# Patient Record
Sex: MALE | Race: WHITE | HISPANIC OR LATINO | URBAN - METROPOLITAN AREA
[De-identification: names, ages, dates, MRNs, and addresses within clinical notes are randomized per-mention and may not be internally consistent; named-entity substitution may affect disease eponyms.]

---

## 2023-10-17 ENCOUNTER — WEB ENCOUNTER (OUTPATIENT)
Dept: URBAN - METROPOLITAN AREA CLINIC 54 | Facility: CLINIC | Age: 48
End: 2023-10-17

## 2023-10-17 ENCOUNTER — OFFICE VISIT (OUTPATIENT)
Dept: URBAN - NONMETROPOLITAN AREA CLINIC 4 | Facility: CLINIC | Age: 48
End: 2023-10-17
Payer: COMMERCIAL

## 2023-10-17 VITALS
HEIGHT: 71 IN | DIASTOLIC BLOOD PRESSURE: 76 MMHG | WEIGHT: 184.8 LBS | HEART RATE: 95 BPM | SYSTOLIC BLOOD PRESSURE: 121 MMHG | BODY MASS INDEX: 25.87 KG/M2 | TEMPERATURE: 97.7 F

## 2023-10-17 DIAGNOSIS — K52.9 ACUTE AND CHRONIC COLITIS: ICD-10-CM

## 2023-10-17 DIAGNOSIS — R10.10 UPPER ABDOMINAL PAIN: ICD-10-CM

## 2023-10-17 DIAGNOSIS — K85.10 ACUTE BILIARY PANCREATITIS WITHOUT INFECTION OR NECROSIS: ICD-10-CM

## 2023-10-17 DIAGNOSIS — K59.01 CONSTIPATION: ICD-10-CM

## 2023-10-17 PROBLEM — 428882003: Status: ACTIVE | Noted: 2023-10-17

## 2023-10-17 PROBLEM — 197321007: Status: ACTIVE | Noted: 2023-10-17

## 2023-10-17 PROCEDURE — 99204 OFFICE O/P NEW MOD 45 MIN: CPT | Performed by: REGISTERED NURSE

## 2023-10-17 RX ORDER — HYOSCYAMINE SULFATE 0.12 MG/1
1 TABLET UNDER THE TONGUE AND ALLOW TO DISSOLVE  AS NEEDED TABLET SUBLINGUAL THREE TIMES A DAY
Status: ACTIVE | COMMUNITY

## 2023-10-17 RX ORDER — DEXTROAMPHETAMINE SACCHARATE, AMPHETAMINE ASPARTATE, DEXTROAMPHETAMINE SULFATE, AND AMPHETAMINE SULFATE 2.5; 2.5; 2.5; 2.5 MG/1; MG/1; MG/1; MG/1
1 TABLET TABLET ORAL TWICE A DAY
Status: ACTIVE | COMMUNITY

## 2023-10-17 RX ORDER — PANTOPRAZOLE SODIUM 40 MG/1
1 TABLET TABLET, DELAYED RELEASE ORAL ONCE A DAY
Status: ACTIVE | COMMUNITY

## 2023-10-17 NOTE — HPI-TODAY'S VISIT:
10/17/23: Pt is a 47 yo male with PMH of GERD, biliary pancreatitis s/p CCY 9/18/23, s/p gastric bypass in Dec 2021 who is self referred for evaluation of abdomnial pain.   Pt reports acute onset of eigastric pain with associated nausea and bloating that occured on 9/10/23. Pain gets so severe, he starts hyperventilating. Pain radiates into his back. He went to the ED on 9/11. LIpase noted at 238,   AST 14, A LT 27,  Alk phos 74,  T bili 0.5. CT scan with no acute findings. Right upper quadrant ultrasound also completed with concerns for some biliary sludge.  No CBD. Mild fatty infiltration of liver also noted. He was admitted for acute pancreatitis. Patient was placed on PPI. Diet was advanced and he was discahrged home on 9/13. He was seen by bariatric surgeon, Dr. Colin Magana on 9/14. He was seen by general surgeon, Dr. Liliya Shafer on 9/15 and underwent lap alice on 9/18. Pt went back to the ED on 9/23 with c/o epigastric pain. CT scan with no cystic free fluid seen inferior to the cecum in the pelvis. No evidence of focal fluid that would suggest abscess in this patient who has recently undergone cholecystectomy Moderate stool which may reflect constipation. He followed up with Dr. Magana on 9/26 and was presribed Carafate. He underwent EGD on 10/3. Records not available, but no acute findings per patient. He was seen in the ED again on 10/10 with persistent epigastric pain, nausea and abdominal bloating. LIpase 173, , . Tbili and Alk phos wnl. CT scan showed mild enteritis. He was discharged home on Augmentin. Since discharge, he had been doing ok until this past weekend when he had recurrent pain. He has been taking Levsin, Zofran, Carafate, Protonix, Tramadol and Miralax. States he has lost alomost 20 lbs. His last colonoscopy was at least 5 years ago at [facility] and was reportedly normal.

## 2023-10-18 LAB
ALBUMIN/GLOBULIN RATIO: 1.7
ALBUMIN: 4.5
ALKALINE PHOSPHATASE: 90
ALT: 90
AST: 43
BILIRUBIN, TOTAL: 0.4
BUN/CREATININE RATIO: (no result)
CALCIUM: 9.8
CARBON DIOXIDE: 33
CHLORIDE: 102
CREATININE: 0.9
EGFR: 104
FIB 4 INDEX: 0.93
GLOBULIN: 2.6
GLUCOSE: 89
PLATELET COUNT: 244
POTASSIUM: 4.2
PROTEIN, TOTAL: 7.1
SODIUM: 139
UREA NITROGEN (BUN): 19

## 2023-10-23 ENCOUNTER — WEB ENCOUNTER (OUTPATIENT)
Dept: URBAN - NONMETROPOLITAN AREA CLINIC 4 | Facility: CLINIC | Age: 48
End: 2023-10-23

## 2023-10-24 ENCOUNTER — OFFICE VISIT (OUTPATIENT)
Dept: URBAN - NONMETROPOLITAN AREA CLINIC 4 | Facility: CLINIC | Age: 48
End: 2023-10-24
Payer: COMMERCIAL

## 2023-10-24 ENCOUNTER — LAB OUTSIDE AN ENCOUNTER (OUTPATIENT)
Dept: URBAN - NONMETROPOLITAN AREA CLINIC 4 | Facility: CLINIC | Age: 48
End: 2023-10-24

## 2023-10-24 VITALS
BODY MASS INDEX: 25.79 KG/M2 | HEIGHT: 71 IN | WEIGHT: 184.2 LBS | DIASTOLIC BLOOD PRESSURE: 72 MMHG | TEMPERATURE: 97.9 F | HEART RATE: 64 BPM | SYSTOLIC BLOOD PRESSURE: 118 MMHG

## 2023-10-24 DIAGNOSIS — R10.84 ABDOMINAL PAIN, GENERALIZED: ICD-10-CM

## 2023-10-24 DIAGNOSIS — K85.10 ACUTE BILIARY PANCREATITIS WITHOUT INFECTION OR NECROSIS: ICD-10-CM

## 2023-10-24 DIAGNOSIS — K59.09 CHRONIC CONSTIPATION: ICD-10-CM

## 2023-10-24 DIAGNOSIS — R79.89 ELEVATED LFTS: ICD-10-CM

## 2023-10-24 DIAGNOSIS — R14.0 ABDOMINAL BLOATING: ICD-10-CM

## 2023-10-24 DIAGNOSIS — R11.0 NAUSEA: ICD-10-CM

## 2023-10-24 DIAGNOSIS — Z12.11 ENCOUNTER FOR SCREENING COLONOSCOPY: ICD-10-CM

## 2023-10-24 DIAGNOSIS — Z90.49 STATUS POST CHOLECYSTECTOMY: ICD-10-CM

## 2023-10-24 DIAGNOSIS — K21.9 GASTROESOPHAGEAL REFLUX DISEASE WITHOUT ESOPHAGITIS: ICD-10-CM

## 2023-10-24 DIAGNOSIS — R10.9 ABDOMINAL CRAMPING: ICD-10-CM

## 2023-10-24 DIAGNOSIS — K76.0 FATTY LIVER: ICD-10-CM

## 2023-10-24 DIAGNOSIS — K59.00 CONSTIPATION, UNSPECIFIED CONSTIPATION TYPE: ICD-10-CM

## 2023-10-24 DIAGNOSIS — Z98.84 STATUS POST GASTRIC BYPASS FOR OBESITY: ICD-10-CM

## 2023-10-24 DIAGNOSIS — R10.10 UPPER ABDOMINAL PAIN: ICD-10-CM

## 2023-10-24 DIAGNOSIS — K52.9 ENTERITIS: ICD-10-CM

## 2023-10-24 PROBLEM — 14760008: Status: ACTIVE | Noted: 2023-10-17

## 2023-10-24 PROCEDURE — 99214 OFFICE O/P EST MOD 30 MIN: CPT | Performed by: REGISTERED NURSE

## 2023-10-24 RX ORDER — HYOSCYAMINE SULFATE 0.12 MG/1
1 TABLET UNDER THE TONGUE AND ALLOW TO DISSOLVE  AS NEEDED TABLET SUBLINGUAL THREE TIMES A DAY
Status: ACTIVE | COMMUNITY

## 2023-10-24 RX ORDER — PANTOPRAZOLE SODIUM 40 MG/1
1 TABLET TABLET, DELAYED RELEASE ORAL ONCE A DAY
Status: ACTIVE | COMMUNITY

## 2023-10-24 RX ORDER — DEXTROAMPHETAMINE SACCHARATE, AMPHETAMINE ASPARTATE, DEXTROAMPHETAMINE SULFATE, AND AMPHETAMINE SULFATE 2.5; 2.5; 2.5; 2.5 MG/1; MG/1; MG/1; MG/1
1 TABLET TABLET ORAL TWICE A DAY
Status: ACTIVE | COMMUNITY

## 2023-10-24 RX ORDER — SODIUM PICOSULFATE, MAGNESIUM OXIDE, AND ANHYDROUS CITRIC ACID 12; 3.5; 1 G/175ML; G/175ML; MG/175ML
175 ML THE FIRST DOSE THE EVENING BEFORE AND SECOND DOSE THE MORNING OF COLONOSCOPY LIQUID ORAL ONCE A DAY
Qty: 350 | Refills: 0 | OUTPATIENT
Start: 2023-10-24 | End: 2023-10-26

## 2023-10-24 NOTE — HPI-TODAY'S VISIT:
10/17/23: Pt is a 47 yo male with PMH of GERD, biliary pancreatitis s/p CCY 9/18/23, s/p gastric bypass in Dec 2021 who is self referred for evaluation of abdomnial pain.   Pt reports acute onset of eigastric pain with associated nausea and bloating that occured on 9/10/23. Pain gets so severe, he starts hyperventilating. Pain radiates into his back. He went to the ED on 9/11. LIpase noted at 238,   AST 14, A LT 27,  Alk phos 74,  T bili 0.5. CT scan with no acute findings. Right upper quadrant ultrasound also completed with concerns for some biliary sludge.  No CBD. Mild fatty infiltration of liver also noted. He was admitted for acute pancreatitis. Patient was placed on PPI. Diet was advanced and he was discahrged home on 9/13. He was seen by bariatric surgeon, Dr. Colin Magana on 9/14. He was seen by general surgeon, Dr. Liliya Shafer on 9/15 and underwent lap alice on 9/18. Pt went back to the ED on 9/23 with c/o epigastric pain. CT scan with no cystic free fluid seen inferior to the cecum in the pelvis. No evidence of focal fluid that would suggest abscess in this patient who has recently undergone cholecystectomy Moderate stool which may reflect constipation. He followed up with Dr. Magana on 9/26 and was presribed Carafate. He underwent EGD on 10/3. Records not available, but no acute findings per patient. He was seen in the ED again on 10/10 with persistent epigastric pain, nausea and abdominal bloating. LIpase 173, , . Tbili and Alk phos wnl. CT scan showed mild enteritis. He was discharged home on Augmentin. Since discharge, he had been doing ok until this past weekend when he had recurrent pain. He has been taking Levsin, Zofran, Carafate, Protonix, Tramadol and Miralax. States he has lost alomost 20 lbs. His last colonoscopy was at least 5 years ago at [facility] and was reportedly normal.  10/24/23: Pt RTC for urgent visit. He reports worsening abdominal pain/crmpaing this past Sunday in the early morning. He took Tramadol. The pain/cramping persisted on Sunday. He has been taking Miralax 1 1/2 caps twice daily. His stools are soft, but he continues to have to strain and does not feel like he completely evacuates. Last night, his pain recurred. He took a Dulcolax and Levsin. Has had 1 BM today. He took Levsin again today which provides some relief. He is not sure when his last colonoscopy was.

## 2023-11-10 ENCOUNTER — OFFICE VISIT (OUTPATIENT)
Dept: URBAN - NONMETROPOLITAN AREA CLINIC 4 | Facility: CLINIC | Age: 48
End: 2023-11-10
Payer: COMMERCIAL

## 2023-11-10 VITALS
TEMPERATURE: 97.9 F | SYSTOLIC BLOOD PRESSURE: 134 MMHG | HEART RATE: 99 BPM | BODY MASS INDEX: 26.04 KG/M2 | DIASTOLIC BLOOD PRESSURE: 77 MMHG | HEIGHT: 71 IN | WEIGHT: 186 LBS

## 2023-11-10 DIAGNOSIS — K21.9 GASTROESOPHAGEAL REFLUX DISEASE WITHOUT ESOPHAGITIS: ICD-10-CM

## 2023-11-10 DIAGNOSIS — K58.1 IRRITABLE BOWEL SYNDROME WITH CONSTIPATION: ICD-10-CM

## 2023-11-10 DIAGNOSIS — Z98.84 STATUS POST GASTRIC BYPASS FOR OBESITY: ICD-10-CM

## 2023-11-10 DIAGNOSIS — Z90.49 STATUS POST CHOLECYSTECTOMY: ICD-10-CM

## 2023-11-10 DIAGNOSIS — K76.0 FATTY LIVER: ICD-10-CM

## 2023-11-10 DIAGNOSIS — Z12.11 ENCOUNTER FOR SCREENING COLONOSCOPY: ICD-10-CM

## 2023-11-10 PROBLEM — 440630006: Status: ACTIVE | Noted: 2023-11-10

## 2023-11-10 PROBLEM — 266435005: Status: ACTIVE | Noted: 2023-10-17

## 2023-11-10 PROCEDURE — 99214 OFFICE O/P EST MOD 30 MIN: CPT | Performed by: REGISTERED NURSE

## 2023-11-10 RX ORDER — LINACLOTIDE 290 UG/1
1 CAPSULE AT LEAST 30 MINUTES BEFORE THE FIRST MEAL OF THE DAY ON AN EMPTY STOMACH CAPSULE, GELATIN COATED ORAL ONCE A DAY
Qty: 90 | Refills: 3 | OUTPATIENT
Start: 2023-11-10 | End: 2024-11-04

## 2023-11-10 RX ORDER — HYOSCYAMINE SULFATE 0.12 MG/1
1 TABLET UNDER THE TONGUE AND ALLOW TO DISSOLVE  AS NEEDED TABLET SUBLINGUAL THREE TIMES A DAY
Status: ACTIVE | COMMUNITY

## 2023-11-10 RX ORDER — DEXTROAMPHETAMINE SACCHARATE, AMPHETAMINE ASPARTATE, DEXTROAMPHETAMINE SULFATE, AND AMPHETAMINE SULFATE 2.5; 2.5; 2.5; 2.5 MG/1; MG/1; MG/1; MG/1
1 TABLET TABLET ORAL TWICE A DAY
Status: ACTIVE | COMMUNITY

## 2023-11-10 RX ORDER — PANTOPRAZOLE SODIUM 40 MG/1
1 TABLET TABLET, DELAYED RELEASE ORAL ONCE A DAY
Status: ACTIVE | COMMUNITY

## 2023-11-10 RX ORDER — FAMOTIDINE 20 MG/1
1 TABLET AT BEDTIME AS NEEDED TABLET, FILM COATED ORAL ONCE A DAY
Qty: 90 TABLET | Refills: 3 | OUTPATIENT
Start: 2023-11-10

## 2023-11-10 NOTE — HPI-TODAY'S VISIT:
10/17/23: Pt is a 47 yo male with PMH of GERD, biliary pancreatitis s/p CCY 9/18/23, s/p gastric bypass in Dec 2021 who is self referred for evaluation of abdomnial pain.   Pt reports acute onset of eigastric pain with associated nausea and bloating that occured on 9/10/23. Pain gets so severe, he starts hyperventilating. Pain radiates into his back. He went to the ED on 9/11. LIpase noted at 238,   AST 14, A LT 27,  Alk phos 74,  T bili 0.5. CT scan with no acute findings. Right upper quadrant ultrasound also completed with concerns for some biliary sludge.  No CBD. Mild fatty infiltration of liver also noted. He was admitted for acute pancreatitis. Patient was placed on PPI. Diet was advanced and he was discahrged home on 9/13. He was seen by bariatric surgeon, Dr. Coiln Magana on 9/14. He was seen by general surgeon, Dr. Liliya Shafer on 9/15 and underwent lap alice on 9/18. Pt went back to the ED on 9/23 with c/o epigastric pain. CT scan with no cystic free fluid seen inferior to the cecum in the pelvis. No evidence of focal fluid that would suggest abscess in this patient who has recently undergone cholecystectomy Moderate stool which may reflect constipation. He followed up with Dr. Magana on 9/26 and was presribed Carafate. He underwent EGD on 10/3. Records not available, but no acute findings per patient. He was seen in the ED again on 10/10 with persistent epigastric pain, nausea and abdominal bloating. LIpase 173, , . Tbili and Alk phos wnl. CT scan showed mild enteritis. He was discharged home on Augmentin. Since discharge, he had been doing ok until this past weekend when he had recurrent pain. He has been taking Levsin, Zofran, Carafate, Protonix, Tramadol and Miralax. States he has lost alomost 20 lbs. His last colonoscopy was at least 5 years ago at [facility] and was reportedly normal.  10/24/23: Pt RTC for urgent visit. He reports worsening abdominal pain/crmpaing this past Sunday in the early morning. He took Tramadol. The pain/cramping persisted on Sunday. He has been taking Miralax 1 1/2 caps twice daily. His stools are soft, but he continues to have to strain and does not feel like he completely evacuates. Last night, his pain recurred. He took a Dulcolax and Levsin. Has had 1 BM today. He took Levsin again today which provides some relief. He is not sure when his last colonoscopy was.  11/10/23: Pt RTC for f/u. Reports improvement in constipation with Linzess. Having a BM daily. He uses a squatty potty. Admits to straining. Stools are soft, but very long. He is still taking Metamucil. He is scheduled for cscope on 12/8/23. Reports mild epigatric burning after drinking crystal light water and a sip of Ana María. He takes Protonix in AM and Pepcid 20 mg at bedtime.

## 2023-11-14 ENCOUNTER — WEB ENCOUNTER (OUTPATIENT)
Dept: URBAN - NONMETROPOLITAN AREA CLINIC 4 | Facility: CLINIC | Age: 48
End: 2023-11-14

## 2023-11-17 ENCOUNTER — OFFICE VISIT (OUTPATIENT)
Dept: URBAN - NONMETROPOLITAN AREA CLINIC 4 | Facility: CLINIC | Age: 48
End: 2023-11-17

## 2023-11-20 ENCOUNTER — LAB OUTSIDE AN ENCOUNTER (OUTPATIENT)
Dept: URBAN - NONMETROPOLITAN AREA CLINIC 4 | Facility: CLINIC | Age: 48
End: 2023-11-20

## 2023-11-20 ENCOUNTER — OFFICE VISIT (OUTPATIENT)
Dept: URBAN - NONMETROPOLITAN AREA CLINIC 4 | Facility: CLINIC | Age: 48
End: 2023-11-20
Payer: COMMERCIAL

## 2023-11-20 VITALS
WEIGHT: 187.8 LBS | HEART RATE: 91 BPM | SYSTOLIC BLOOD PRESSURE: 114 MMHG | TEMPERATURE: 97.5 F | HEIGHT: 71 IN | DIASTOLIC BLOOD PRESSURE: 72 MMHG | BODY MASS INDEX: 26.29 KG/M2

## 2023-11-20 DIAGNOSIS — Z98.84 STATUS POST GASTRIC BYPASS FOR OBESITY: ICD-10-CM

## 2023-11-20 DIAGNOSIS — K58.1 IRRITABLE BOWEL SYNDROME WITH CONSTIPATION: ICD-10-CM

## 2023-11-20 DIAGNOSIS — K76.0 FATTY LIVER: ICD-10-CM

## 2023-11-20 DIAGNOSIS — R10.13 POSTPRANDIAL EPIGASTRIC PAIN: ICD-10-CM

## 2023-11-20 DIAGNOSIS — K21.9 GASTROESOPHAGEAL REFLUX DISEASE WITHOUT ESOPHAGITIS: ICD-10-CM

## 2023-11-20 DIAGNOSIS — Z90.49 STATUS POST CHOLECYSTECTOMY: ICD-10-CM

## 2023-11-20 PROCEDURE — 99214 OFFICE O/P EST MOD 30 MIN: CPT | Performed by: REGISTERED NURSE

## 2023-11-20 RX ORDER — DEXTROAMPHETAMINE SACCHARATE, AMPHETAMINE ASPARTATE, DEXTROAMPHETAMINE SULFATE, AND AMPHETAMINE SULFATE 2.5; 2.5; 2.5; 2.5 MG/1; MG/1; MG/1; MG/1
1 TABLET TABLET ORAL TWICE A DAY
Status: ACTIVE | COMMUNITY

## 2023-11-20 RX ORDER — PANTOPRAZOLE SODIUM 40 MG/1
1 TABLET TABLET, DELAYED RELEASE ORAL ONCE A DAY
Status: ACTIVE | COMMUNITY

## 2023-11-20 RX ORDER — FAMOTIDINE 20 MG/1
1 TABLET AT BEDTIME AS NEEDED TABLET, FILM COATED ORAL ONCE A DAY
Qty: 90 TABLET | Refills: 3 | Status: ACTIVE | COMMUNITY
Start: 2023-11-10

## 2023-11-20 RX ORDER — LINACLOTIDE 290 UG/1
1 CAPSULE AT LEAST 30 MINUTES BEFORE THE FIRST MEAL OF THE DAY ON AN EMPTY STOMACH CAPSULE, GELATIN COATED ORAL ONCE A DAY
Qty: 90 | Refills: 3 | Status: ACTIVE | COMMUNITY
Start: 2023-11-10 | End: 2024-11-04

## 2023-11-20 RX ORDER — HYOSCYAMINE SULFATE 0.12 MG/1
1 TABLET UNDER THE TONGUE AND ALLOW TO DISSOLVE  AS NEEDED TABLET SUBLINGUAL THREE TIMES A DAY
Status: ACTIVE | COMMUNITY

## 2023-11-20 NOTE — HPI-TODAY'S VISIT:
10/17/23: Pt is a 47 yo male with PMH of GERD, biliary pancreatitis s/p CCY 9/18/23, s/p gastric bypass in Dec 2021 who is self referred for evaluation of abdomnial pain.   Pt reports acute onset of eigastric pain with associated nausea and bloating that occured on 9/10/23. Pain gets so severe, he starts hyperventilating. Pain radiates into his back. He went to the ED on 9/11. LIpase noted at 238,   AST 14, A LT 27,  Alk phos 74,  T bili 0.5. CT scan with no acute findings. Right upper quadrant ultrasound also completed with concerns for some biliary sludge.  No CBD. Mild fatty infiltration of liver also noted. He was admitted for acute pancreatitis. Patient was placed on PPI. Diet was advanced and he was discahrged home on 9/13. He was seen by bariatric surgeon, Dr. Colin Magana on 9/14. He was seen by general surgeon, Dr. Liliya Shafer on 9/15 and underwent lap alice on 9/18. Pt went back to the ED on 9/23 with c/o epigastric pain. CT scan with no cystic free fluid seen inferior to the cecum in the pelvis. No evidence of focal fluid that would suggest abscess in this patient who has recently undergone cholecystectomy Moderate stool which may reflect constipation. He followed up with Dr. Magana on 9/26 and was presribed Carafate. He underwent EGD on 10/3. Records not available, but no acute findings per patient. He was seen in the ED again on 10/10 with persistent epigastric pain, nausea and abdominal bloating. LIpase 173, , . Tbili and Alk phos wnl. CT scan showed mild enteritis. He was discharged home on Augmentin. Since discharge, he had been doing ok until this past weekend when he had recurrent pain. He has been taking Levsin, Zofran, Carafate, Protonix, Tramadol and Miralax. States he has lost alomost 20 lbs. His last colonoscopy was at least 5 years ago at [facility] and was reportedly normal.  10/24/23: Pt RTC for urgent visit. He reports worsening abdominal pain/crmpaing this past Sunday in the early morning. He took Tramadol. The pain/cramping persisted on Sunday. He has been taking Miralax 1 1/2 caps twice daily. His stools are soft, but he continues to have to strain and does not feel like he completely evacuates. Last night, his pain recurred. He took a Dulcolax and Levsin. Has had 1 BM today. He took Levsin again today which provides some relief. He is not sure when his last colonoscopy was.  11/10/23: Pt RTC for f/u. Reports improvement in constipation with Linzess. Having a BM daily. He uses a squatty potty. Admits to straining. Stools are soft, but very long. He is still taking Metamucil. He is scheduled for cscope on 12/8/23. Reports mild epigatric burning after drinking crystal light water and a sip of Ana María. He takes Protonix in AM and Pepcid 20 mg at bedtime.  11/20/23: Pt RTC for urgent visit. He c/o recurrent intermittent postprandial abdominal pain this past weekend and the weekend prior. Pain ussually occurs about 15-30 minutes after eating. He has also noticed a correlation with hypoglycemia. He has a histoty of reactive hypoglycemia. He has been taking Tramdol and Levsin. Per Dr. Vidal's progress note on 10/17, EGD was negative for identifying an ulcer.  There was no erythema or erosion.  Biopsies came back negative for H. pylori or gastritis.  Of importance I noticed the stoma of his gastrojejunal anastomosis was slightly enlarged and he has a candycane shape stump of the Caity limb.

## 2023-12-12 ENCOUNTER — OFFICE VISIT (OUTPATIENT)
Dept: URBAN - METROPOLITAN AREA SURGERY CENTER 14 | Facility: SURGERY CENTER | Age: 48
End: 2023-12-12

## 2023-12-14 ENCOUNTER — OUT OF OFFICE VISIT (OUTPATIENT)
Dept: URBAN - METROPOLITAN AREA SURGERY CENTER 14 | Facility: SURGERY CENTER | Age: 48
End: 2023-12-14
Payer: COMMERCIAL

## 2023-12-14 ENCOUNTER — OFFICE VISIT (OUTPATIENT)
Dept: URBAN - METROPOLITAN AREA SURGERY CENTER 14 | Facility: SURGERY CENTER | Age: 48
End: 2023-12-14

## 2023-12-14 DIAGNOSIS — Z12.11 COLON CANCER SCREENING: ICD-10-CM

## 2023-12-14 PROCEDURE — G8907 PT DOC NO EVENTS ON DISCHARG: HCPCS | Performed by: INTERNAL MEDICINE

## 2023-12-14 PROCEDURE — 00812 ANES LWR INTST SCR COLSC: CPT | Performed by: NURSE ANESTHETIST, CERTIFIED REGISTERED

## 2023-12-14 PROCEDURE — G0121 COLON CA SCRN NOT HI RSK IND: HCPCS | Performed by: INTERNAL MEDICINE

## 2023-12-19 ENCOUNTER — WEB ENCOUNTER (OUTPATIENT)
Dept: URBAN - NONMETROPOLITAN AREA CLINIC 4 | Facility: CLINIC | Age: 48
End: 2023-12-19

## 2024-01-08 ENCOUNTER — OFFICE VISIT (OUTPATIENT)
Dept: URBAN - NONMETROPOLITAN AREA CLINIC 4 | Facility: CLINIC | Age: 49
End: 2024-01-08
Payer: COMMERCIAL

## 2024-01-08 VITALS
DIASTOLIC BLOOD PRESSURE: 81 MMHG | BODY MASS INDEX: 26.91 KG/M2 | TEMPERATURE: 97.7 F | WEIGHT: 192.2 LBS | SYSTOLIC BLOOD PRESSURE: 125 MMHG | HEART RATE: 90 BPM | HEIGHT: 71 IN

## 2024-01-08 DIAGNOSIS — K21.9 GASTROESOPHAGEAL REFLUX DISEASE WITHOUT ESOPHAGITIS: ICD-10-CM

## 2024-01-08 DIAGNOSIS — K58.1 IRRITABLE BOWEL SYNDROME WITH CONSTIPATION: ICD-10-CM

## 2024-01-08 DIAGNOSIS — Z90.49 STATUS POST CHOLECYSTECTOMY: ICD-10-CM

## 2024-01-08 DIAGNOSIS — R10.13 POSTPRANDIAL EPIGASTRIC PAIN: ICD-10-CM

## 2024-01-08 DIAGNOSIS — Z98.84 STATUS POST GASTRIC BYPASS FOR OBESITY: ICD-10-CM

## 2024-01-08 DIAGNOSIS — K76.0 FATTY LIVER: ICD-10-CM

## 2024-01-08 PROCEDURE — 99213 OFFICE O/P EST LOW 20 MIN: CPT | Performed by: REGISTERED NURSE

## 2024-01-08 RX ORDER — LINACLOTIDE 290 UG/1
1 CAPSULE AT LEAST 30 MINUTES BEFORE THE FIRST MEAL OF THE DAY ON AN EMPTY STOMACH CAPSULE, GELATIN COATED ORAL ONCE A DAY
Qty: 90 | Refills: 3 | Status: ACTIVE | COMMUNITY
Start: 2023-11-10 | End: 2024-11-04

## 2024-01-08 RX ORDER — ACARBOSE 25 MG/1
AS DIRECTED TABLET ORAL
Status: ACTIVE | COMMUNITY

## 2024-01-08 RX ORDER — HYOSCYAMINE SULFATE 0.12 MG/1
1 TABLET UNDER THE TONGUE AND ALLOW TO DISSOLVE  AS NEEDED TABLET SUBLINGUAL THREE TIMES A DAY
Status: ACTIVE | COMMUNITY

## 2024-01-08 RX ORDER — HYOSCYAMINE SULFATE 0.12 MG/1
1 TABLET UNDER THE TONGUE AND ALLOW TO DISSOLVE AS NEEDED TABLET SUBLINGUAL THREE TIMES A DAY
Qty: 120 | Refills: 3

## 2024-01-08 RX ORDER — FAMOTIDINE 20 MG/1
1 TABLET AT BEDTIME AS NEEDED TABLET, FILM COATED ORAL ONCE A DAY
Qty: 90 TABLET | Refills: 3 | Status: ACTIVE | COMMUNITY
Start: 2023-11-10

## 2024-01-08 RX ORDER — PANTOPRAZOLE SODIUM 40 MG/1
1 TABLET TABLET, DELAYED RELEASE ORAL ONCE A DAY
Status: ACTIVE | COMMUNITY

## 2024-01-08 RX ORDER — DEXTROAMPHETAMINE SACCHARATE, AMPHETAMINE ASPARTATE, DEXTROAMPHETAMINE SULFATE, AND AMPHETAMINE SULFATE 2.5; 2.5; 2.5; 2.5 MG/1; MG/1; MG/1; MG/1
1 TABLET TABLET ORAL TWICE A DAY
Status: ACTIVE | COMMUNITY

## 2024-01-08 NOTE — HPI-TODAY'S VISIT:
10/17/23: Pt is a 49 yo male with PMH of GERD, biliary pancreatitis s/p CCY 9/18/23, s/p gastric bypass in Dec 2021 who is self referred for evaluation of abdomnial pain.   Pt reports acute onset of eigastric pain with associated nausea and bloating that occured on 9/10/23. Pain gets so severe, he starts hyperventilating. Pain radiates into his back. He went to the ED on 9/11. LIpase noted at 238,   AST 14, A LT 27,  Alk phos 74,  T bili 0.5. CT scan with no acute findings. Right upper quadrant ultrasound also completed with concerns for some biliary sludge.  No CBD. Mild fatty infiltration of liver also noted. He was admitted for acute pancreatitis. Patient was placed on PPI. Diet was advanced and he was discahrged home on 9/13. He was seen by bariatric surgeon, Dr. Colin Magana on 9/14. He was seen by general surgeon, Dr. Liliya Shafer on 9/15 and underwent lap alice on 9/18. Pt went back to the ED on 9/23 with c/o epigastric pain. CT scan with no cystic free fluid seen inferior to the cecum in the pelvis. No evidence of focal fluid that would suggest abscess in this patient who has recently undergone cholecystectomy Moderate stool which may reflect constipation. He followed up with Dr. Magana on 9/26 and was presribed Carafate. He underwent EGD on 10/3. Records not available, but no acute findings per patient. He was seen in the ED again on 10/10 with persistent epigastric pain, nausea and abdominal bloating. LIpase 173, , . Tbili and Alk phos wnl. CT scan showed mild enteritis. He was discharged home on Augmentin. Since discharge, he had been doing ok until this past weekend when he had recurrent pain. He has been taking Levsin, Zofran, Carafate, Protonix, Tramadol and Miralax. States he has lost alomost 20 lbs. His last colonoscopy was at least 5 years ago at [facility] and was reportedly normal.  10/24/23: Pt RTC for urgent visit. He reports worsening abdominal pain/crmpaing this past Sunday in the early morning. He took Tramadol. The pain/cramping persisted on Sunday. He has been taking Miralax 1 1/2 caps twice daily. His stools are soft, but he continues to have to strain and does not feel like he completely evacuates. Last night, his pain recurred. He took a Dulcolax and Levsin. Has had 1 BM today. He took Levsin again today which provides some relief. He is not sure when his last colonoscopy was.  11/10/23: Pt RTC for f/u. Reports improvement in constipation with Linzess. Having a BM daily. He uses a squatty potty. Admits to straining. Stools are soft, but very long. He is still taking Metamucil. He is scheduled for cscope on 12/8/23. Reports mild epigatric burning after drinking crystal light water and a sip of Ana María. He takes Protonix in AM and Pepcid 20 mg at bedtime.  11/20/23: Pt RTC for urgent visit. He c/o recurrent intermittent postprandial abdominal pain this past weekend and the weekend prior. Pain ussually occurs about 15-30 minutes after eating. He has also noticed a correlation with hypoglycemia. He has a histoty of reactive hypoglycemia. He has been taking Tramdol and Levsin. Per Dr. Vidal's progress note on 10/17, EGD was negative for identifying an ulcer.  There was no erythema or erosion.  Biopsies came back negative for H. pylori or gastritis.  Of importance I noticed the stoma of his gastrojejunal anastomosis was slightly enlarged and he has a candycane shape stump of the Caity limb.  1/8/24: Pt RTC for f/u. Had cscope 12/14/23: - The entire examined colon is normal. - No specimens collected.  MRI/MRCP 12/16/23: Status post cholecystectomy. No evidence of choledocholithiasis. No intra or extra hepatic biliary ductal dilatation.  Pt reports improvement in symptoms. Remains on Linzess and takes Levsin prn. He started a new medication, acarbose, for reactive hypoglycemia.

## 2024-01-22 ENCOUNTER — OFFICE VISIT (OUTPATIENT)
Dept: URBAN - METROPOLITAN AREA CLINIC 54 | Facility: CLINIC | Age: 49
End: 2024-01-22

## 2024-02-09 ENCOUNTER — OV EP (OUTPATIENT)
Dept: URBAN - NONMETROPOLITAN AREA CLINIC 4 | Facility: CLINIC | Age: 49
End: 2024-02-09
Payer: COMMERCIAL

## 2024-02-09 VITALS
HEIGHT: 71 IN | WEIGHT: 198 LBS | TEMPERATURE: 97.7 F | SYSTOLIC BLOOD PRESSURE: 121 MMHG | DIASTOLIC BLOOD PRESSURE: 80 MMHG | HEART RATE: 68 BPM | BODY MASS INDEX: 27.72 KG/M2

## 2024-02-09 DIAGNOSIS — Z90.49 STATUS POST CHOLECYSTECTOMY: ICD-10-CM

## 2024-02-09 DIAGNOSIS — K58.1 IRRITABLE BOWEL SYNDROME WITH CONSTIPATION: ICD-10-CM

## 2024-02-09 DIAGNOSIS — R10.13 POSTPRANDIAL EPIGASTRIC PAIN: ICD-10-CM

## 2024-02-09 DIAGNOSIS — K76.0 FATTY LIVER: ICD-10-CM

## 2024-02-09 DIAGNOSIS — Z98.84 STATUS POST GASTRIC BYPASS FOR OBESITY: ICD-10-CM

## 2024-02-09 DIAGNOSIS — K21.9 GASTROESOPHAGEAL REFLUX DISEASE WITHOUT ESOPHAGITIS: ICD-10-CM

## 2024-02-09 PROCEDURE — 99213 OFFICE O/P EST LOW 20 MIN: CPT | Performed by: REGISTERED NURSE

## 2024-02-09 RX ORDER — HYOSCYAMINE SULFATE 0.12 MG/1
1 TABLET UNDER THE TONGUE AND ALLOW TO DISSOLVE AS NEEDED TABLET SUBLINGUAL THREE TIMES A DAY
Qty: 120 | Refills: 3 | Status: ACTIVE | COMMUNITY

## 2024-02-09 RX ORDER — LINACLOTIDE 290 UG/1
1 CAPSULE AT LEAST 30 MINUTES BEFORE THE FIRST MEAL OF THE DAY ON AN EMPTY STOMACH CAPSULE, GELATIN COATED ORAL ONCE A DAY
Qty: 90 | Refills: 3 | Status: ACTIVE | COMMUNITY
Start: 2023-11-10 | End: 2024-11-04

## 2024-02-09 RX ORDER — PANTOPRAZOLE SODIUM 40 MG/1
1 TABLET TABLET, DELAYED RELEASE ORAL ONCE A DAY
Status: ACTIVE | COMMUNITY

## 2024-02-09 RX ORDER — FAMOTIDINE 20 MG/1
1 TABLET AT BEDTIME AS NEEDED TABLET, FILM COATED ORAL ONCE A DAY
Qty: 90 TABLET | Refills: 3 | Status: ACTIVE | COMMUNITY
Start: 2023-11-10

## 2024-02-09 RX ORDER — ACARBOSE 25 MG/1
AS DIRECTED TABLET ORAL
Status: ACTIVE | COMMUNITY

## 2024-02-09 RX ORDER — DEXTROAMPHETAMINE SACCHARATE, AMPHETAMINE ASPARTATE, DEXTROAMPHETAMINE SULFATE, AND AMPHETAMINE SULFATE 2.5; 2.5; 2.5; 2.5 MG/1; MG/1; MG/1; MG/1
1 TABLET TABLET ORAL TWICE A DAY
Status: ACTIVE | COMMUNITY

## 2024-02-09 NOTE — HPI-TODAY'S VISIT:
10/17/23: Pt is a 47 yo male with PMH of GERD, biliary pancreatitis s/p CCY 9/18/23, s/p gastric bypass in Dec 2021 who is self referred for evaluation of abdomnial pain.   Pt reports acute onset of eigastric pain with associated nausea and bloating that occured on 9/10/23. Pain gets so severe, he starts hyperventilating. Pain radiates into his back. He went to the ED on 9/11. LIpase noted at 238,   AST 14, A LT 27,  Alk phos 74,  T bili 0.5. CT scan with no acute findings. Right upper quadrant ultrasound also completed with concerns for some biliary sludge.  No CBD. Mild fatty infiltration of liver also noted. He was admitted for acute pancreatitis. Patient was placed on PPI. Diet was advanced and he was discahrged home on 9/13. He was seen by bariatric surgeon, Dr. Colin Magana on 9/14. He was seen by general surgeon, Dr. Liliya Shafer on 9/15 and underwent lap alice on 9/18. Pt went back to the ED on 9/23 with c/o epigastric pain. CT scan with no cystic free fluid seen inferior to the cecum in the pelvis. No evidence of focal fluid that would suggest abscess in this patient who has recently undergone cholecystectomy Moderate stool which may reflect constipation. He followed up with Dr. Magana on 9/26 and was presribed Carafate. He underwent EGD on 10/3. Records not available, but no acute findings per patient. He was seen in the ED again on 10/10 with persistent epigastric pain, nausea and abdominal bloating. LIpase 173, , . Tbili and Alk phos wnl. CT scan showed mild enteritis. He was discharged home on Augmentin. Since discharge, he had been doing ok until this past weekend when he had recurrent pain. He has been taking Levsin, Zofran, Carafate, Protonix, Tramadol and Miralax. States he has lost alomost 20 lbs. His last colonoscopy was at least 5 years ago at [facility] and was reportedly normal.  10/24/23: Pt RTC for urgent visit. He reports worsening abdominal pain/crmpaing this past Sunday in the early morning. He took Tramadol. The pain/cramping persisted on Sunday. He has been taking Miralax 1 1/2 caps twice daily. His stools are soft, but he continues to have to strain and does not feel like he completely evacuates. Last night, his pain recurred. He took a Dulcolax and Levsin. Has had 1 BM today. He took Levsin again today which provides some relief. He is not sure when his last colonoscopy was.  11/10/23: Pt RTC for f/u. Reports improvement in constipation with Linzess. Having a BM daily. He uses a squatty potty. Admits to straining. Stools are soft, but very long. He is still taking Metamucil. He is scheduled for cscope on 12/8/23. Reports mild epigatric burning after drinking crystal light water and a sip of Ana María. He takes Protonix in AM and Pepcid 20 mg at bedtime.  11/20/23: Pt RTC for urgent visit. He c/o recurrent intermittent postprandial abdominal pain this past weekend and the weekend prior. Pain ussually occurs about 15-30 minutes after eating. He has also noticed a correlation with hypoglycemia. He has a histoty of reactive hypoglycemia. He has been taking Tramdol and Levsin. Per Dr. Vidal's progress note on 10/17, EGD was negative for identifying an ulcer.  There was no erythema or erosion.  Biopsies came back negative for H. pylori or gastritis.  Of importance I noticed the stoma of his gastrojejunal anastomosis was slightly enlarged and he has a candycane shape stump of the Caity limb.  1/8/24: Pt RTC for f/u. Had cscope 12/14/23: - The entire examined colon is normal. - No specimens collected.  MRI/MRCP 12/16/23: Status post cholecystectomy. No evidence of choledocholithiasis. No intra or extra hepatic biliary ductal dilatation.  Pt reports improvement in symptoms. Remains on Linzess and takes Levsin prn. He started a new medication, acarbose, for reactive hypoglycemia.  2/9/24: Pt RTC for f/u. Doing well on Linzess with no new complaints. Takes Levsin prn. States his company is requesting that he come into the office twice a week, which involves anywhere from a 1-2 hour commute. He is a  for [employer]. No significant past surgical history

## 2024-03-07 ENCOUNTER — OV EP (OUTPATIENT)
Dept: URBAN - NONMETROPOLITAN AREA CLINIC 4 | Facility: CLINIC | Age: 49
End: 2024-03-07
Payer: COMMERCIAL

## 2024-03-07 VITALS — BODY MASS INDEX: 26.35 KG/M2 | TEMPERATURE: 99 F | HEIGHT: 71 IN | WEIGHT: 188.2 LBS

## 2024-03-07 DIAGNOSIS — K76.0 FATTY LIVER: ICD-10-CM

## 2024-03-07 DIAGNOSIS — K85.90 RECURRENT ACUTE PANCREATITIS: ICD-10-CM

## 2024-03-07 DIAGNOSIS — K58.1 IRRITABLE BOWEL SYNDROME WITH CONSTIPATION: ICD-10-CM

## 2024-03-07 DIAGNOSIS — Z90.49 STATUS POST CHOLECYSTECTOMY: ICD-10-CM

## 2024-03-07 DIAGNOSIS — R74.01 TRANSAMINITIS: ICD-10-CM

## 2024-03-07 DIAGNOSIS — Z98.84 STATUS POST GASTRIC BYPASS FOR OBESITY: ICD-10-CM

## 2024-03-07 DIAGNOSIS — K21.9 GASTROESOPHAGEAL REFLUX DISEASE WITHOUT ESOPHAGITIS: ICD-10-CM

## 2024-03-07 DIAGNOSIS — R10.13 POSTPRANDIAL EPIGASTRIC PAIN: ICD-10-CM

## 2024-03-07 PROCEDURE — 99214 OFFICE O/P EST MOD 30 MIN: CPT | Performed by: REGISTERED NURSE

## 2024-03-07 RX ORDER — PANTOPRAZOLE SODIUM 40 MG/1
1 TABLET TABLET, DELAYED RELEASE ORAL ONCE A DAY
Status: ACTIVE | COMMUNITY

## 2024-03-07 RX ORDER — LINACLOTIDE 290 UG/1
1 CAPSULE AT LEAST 30 MINUTES BEFORE THE FIRST MEAL OF THE DAY ON AN EMPTY STOMACH CAPSULE, GELATIN COATED ORAL ONCE A DAY
Qty: 90 | Refills: 3 | Status: ACTIVE | COMMUNITY
Start: 2023-11-10 | End: 2024-11-04

## 2024-03-07 RX ORDER — ACARBOSE 25 MG/1
AS DIRECTED TABLET ORAL
Status: ACTIVE | COMMUNITY

## 2024-03-07 RX ORDER — FAMOTIDINE 20 MG/1
1 TABLET AT BEDTIME AS NEEDED TABLET, FILM COATED ORAL ONCE A DAY
Qty: 90 TABLET | Refills: 3 | Status: ACTIVE | COMMUNITY
Start: 2023-11-10

## 2024-03-07 RX ORDER — DEXTROAMPHETAMINE SACCHARATE, AMPHETAMINE ASPARTATE, DEXTROAMPHETAMINE SULFATE, AND AMPHETAMINE SULFATE 2.5; 2.5; 2.5; 2.5 MG/1; MG/1; MG/1; MG/1
1 TABLET TABLET ORAL TWICE A DAY
Status: ACTIVE | COMMUNITY

## 2024-03-07 RX ORDER — HYOSCYAMINE SULFATE 0.12 MG/1
1 TABLET UNDER THE TONGUE AND ALLOW TO DISSOLVE AS NEEDED TABLET SUBLINGUAL THREE TIMES A DAY
Refills: 3 | Status: ACTIVE | COMMUNITY

## 2024-03-07 NOTE — HPI-TODAY'S VISIT:
10/17/23: Pt is a 47 yo male with PMH of GERD, biliary pancreatitis s/p CCY 9/18/23, s/p Jayesh-en-Y gastric bypass in Dec 2021 who is self referred for evaluation of abdomnial pain.   Pt reports acute onset of eigastric pain with associated nausea and bloating that occured on 9/10/23. Pain gets so severe, he starts hyperventilating. Pain radiates into his back. He went to the ED on 9/11. LIpase noted at 238,   AST 14, A LT 27,  Alk phos 74,  T bili 0.5. CT scan with no acute findings. Right upper quadrant ultrasound also completed with concerns for some biliary sludge.  No CBD. Mild fatty infiltration of liver also noted. He was admitted for acute pancreatitis. Patient was placed on PPI. Diet was advanced and he was discahrged home on 9/13. He was seen by bariatric surgeon, Dr. Colin Magana on 9/14. He was seen by general surgeon, Dr. Liliya Shafer on 9/15 and underwent lap alice on 9/18. Pt went back to the ED on 9/23 with c/o epigastric pain. CT scan with no cystic free fluid seen inferior to the cecum in the pelvis. No evidence of focal fluid that would suggest abscess in this patient who has recently undergone cholecystectomy Moderate stool which may reflect constipation. He followed up with Dr. Magana on 9/26 and was presribed Carafate. He underwent EGD on 10/3. Records not available, but no acute findings per patient. He was seen in the ED again on 10/10 with persistent epigastric pain, nausea and abdominal bloating. LIpase 173, , . Tbili and Alk phos wnl. CT scan showed mild enteritis. He was discharged home on Augmentin. Since discharge, he had been doing ok until this past weekend when he had recurrent pain. He has been taking Levsin, Zofran, Carafate, Protonix, Tramadol and Miralax. States he has lost alomost 20 lbs. His last colonoscopy was at least 5 years ago at [facility] and was reportedly normal.  10/24/23: Pt RTC for urgent visit. He reports worsening abdominal pain/crmpaing this past Lionel in the early morning. He took Tramadol. The pain/cramping persisted on Sunday. He has been taking Miralax 1 1/2 caps twice daily. His stools are soft, but he continues to have to strain and does not feel like he completely evacuates. Last night, his pain recurred. He took a Dulcolax and Levsin. Has had 1 BM today. He took Levsin again today which provides some relief. He is not sure when his last colonoscopy was.  11/10/23: Pt RTC for f/u. Reports improvement in constipation with Linzess. Having a BM daily. He uses a squatty potty. Admits to straining. Stools are soft, but very long. He is still taking Metamucil. He is scheduled for cscope on 12/8/23. Reports mild epigatric burning after drinking crystal light water and a sip of Ana María. He takes Protonix in AM and Pepcid 20 mg at bedtime.  11/20/23: Pt RTC for urgent visit. He c/o recurrent intermittent postprandial abdominal pain this past weekend and the weekend prior. Pain ussually occurs about 15-30 minutes after eating. He has also noticed a correlation with hypoglycemia. He has a histoty of reactive hypoglycemia. He has been taking Tramdol and Levsin. Per Dr. Vidal's progress note on 10/17, EGD was negative for identifying an ulcer.  There was no erythema or erosion.  Biopsies came back negative for H. pylori or gastritis.  Of importance I noticed the stoma of his gastrojejunal anastomosis was slightly enlarged and he has a candycane shape stump of the Jayesh limb.  1/8/24: Pt RTC for f/u. Had cscope 12/14/23: - The entire examined colon is normal. - No specimens collected.  MRI/MRCP 12/16/23: Status post cholecystectomy. No evidence of choledocholithiasis. No intra or extra hepatic biliary ductal dilatation.  Pt reports improvement in symptoms. Remains on Linzess and takes Levsin prn. He started a new medication, acarbose, for reactive hypoglycemia.  2/9/24: Pt RTC for f/u. Doing well on Linzess with no new complaints. Takes Levsin prn. States his company is requesting that he come into the office twice a week, which involves anywhere from a 1-2 hour commute. He is a  for [employer].  3/7/24: Pt RTC for f/u. He was seen in ED on 2/29, then admitted from 3/1-3/3 for epigastric abdominal pain, acute pancreatitis and transaminitis. Lipase 48-->134. LFTs elevated -->1034-->934-->391, -->646-->747-->552. Tbili and Alk phos wnl. Acute hep panel neg. Secondary liver w/u neg. TG normal. He started acarbose 2 months ago. EtOH level normal. CT and MRCP unremarkable. Since discharge, he continues to have postprandial epigastric pain that wraps around to his back and abdominal bloating. He has lost 10 lbs. He remains on Linzess, Protonix and Pepcid.

## 2024-03-10 LAB — IGG, SUBCLASS 4: 108.8

## 2024-03-21 ENCOUNTER — LAB (OUTPATIENT)
Dept: URBAN - NONMETROPOLITAN AREA CLINIC 4 | Facility: CLINIC | Age: 49
End: 2024-03-21

## 2024-07-08 ENCOUNTER — OFFICE VISIT (OUTPATIENT)
Dept: URBAN - NONMETROPOLITAN AREA CLINIC 4 | Facility: CLINIC | Age: 49
End: 2024-07-08

## 2024-09-10 ENCOUNTER — TELEPHONE ENCOUNTER (OUTPATIENT)
Dept: URBAN - NONMETROPOLITAN AREA CLINIC 4 | Facility: CLINIC | Age: 49
End: 2024-09-10

## 2024-09-10 RX ORDER — PANCRELIPASE 36000; 180000; 114000 [USP'U]/1; [USP'U]/1; [USP'U]/1
AS DIRECTED CAPSULE, DELAYED RELEASE PELLETS ORAL DAILY
Qty: 300 | Refills: 3
Start: 2024-03-18 | End: 2025-01-08

## 2024-09-23 ENCOUNTER — DASHBOARD ENCOUNTERS (OUTPATIENT)
Age: 49
End: 2024-09-23

## 2024-09-23 ENCOUNTER — OFFICE VISIT (OUTPATIENT)
Dept: URBAN - NONMETROPOLITAN AREA CLINIC 4 | Facility: CLINIC | Age: 49
End: 2024-09-23
Payer: COMMERCIAL

## 2024-09-23 VITALS
SYSTOLIC BLOOD PRESSURE: 147 MMHG | WEIGHT: 183 LBS | TEMPERATURE: 98 F | DIASTOLIC BLOOD PRESSURE: 92 MMHG | HEIGHT: 71 IN | HEART RATE: 94 BPM | BODY MASS INDEX: 25.62 KG/M2

## 2024-09-23 DIAGNOSIS — K86.1 ACUTE ON CHRONIC PANCREATITIS: ICD-10-CM

## 2024-09-23 DIAGNOSIS — K21.9 GASTROESOPHAGEAL REFLUX DISEASE WITHOUT ESOPHAGITIS: ICD-10-CM

## 2024-09-23 DIAGNOSIS — K58.1 IRRITABLE BOWEL SYNDROME WITH CONSTIPATION: ICD-10-CM

## 2024-09-23 DIAGNOSIS — K76.0 FATTY LIVER: ICD-10-CM

## 2024-09-23 PROCEDURE — 99214 OFFICE O/P EST MOD 30 MIN: CPT | Performed by: REGISTERED NURSE

## 2024-09-23 RX ORDER — ACARBOSE 25 MG/1
AS DIRECTED TABLET ORAL
Status: ACTIVE | COMMUNITY

## 2024-09-23 RX ORDER — LINACLOTIDE 290 UG/1
1 CAPSULE AT LEAST 30 MINUTES BEFORE THE FIRST MEAL OF THE DAY ON AN EMPTY STOMACH CAPSULE, GELATIN COATED ORAL ONCE A DAY
Qty: 90 | Refills: 3 | Status: ACTIVE | COMMUNITY
Start: 2023-11-10 | End: 2024-11-04

## 2024-09-23 RX ORDER — FAMOTIDINE 20 MG/1
1 TABLET AT BEDTIME AS NEEDED TABLET, FILM COATED ORAL ONCE A DAY
Qty: 90 TABLET | Refills: 3 | Status: ACTIVE | COMMUNITY
Start: 2023-11-10

## 2024-09-23 RX ORDER — HYOSCYAMINE SULFATE 0.12 MG/1
1 TABLET UNDER THE TONGUE AND ALLOW TO DISSOLVE AS NEEDED TABLET SUBLINGUAL THREE TIMES A DAY
Refills: 3 | Status: ACTIVE | COMMUNITY

## 2024-09-23 RX ORDER — PANCRELIPASE 36000; 180000; 114000 [USP'U]/1; [USP'U]/1; [USP'U]/1
AS DIRECTED CAPSULE, DELAYED RELEASE PELLETS ORAL DAILY
Qty: 300 | Refills: 3 | Status: ACTIVE | COMMUNITY
Start: 2024-03-18 | End: 2025-01-08

## 2024-09-23 RX ORDER — DEXTROAMPHETAMINE SACCHARATE, AMPHETAMINE ASPARTATE, DEXTROAMPHETAMINE SULFATE, AND AMPHETAMINE SULFATE 2.5; 2.5; 2.5; 2.5 MG/1; MG/1; MG/1; MG/1
1 TABLET TABLET ORAL TWICE A DAY
Status: ACTIVE | COMMUNITY

## 2024-09-23 RX ORDER — PANTOPRAZOLE SODIUM 40 MG/1
1 TABLET TABLET, DELAYED RELEASE ORAL ONCE A DAY
Status: ACTIVE | COMMUNITY

## 2024-09-23 NOTE — HPI-TODAY'S VISIT:
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   Pt reports acute onset of eigastric pain with associated nausea and bloating that occured on 9/10/23. Pain gets so severe, he starts hyperventilating. Pain radiates into his back. He went to the ED on 9/11. LIpase noted at 238,   AST 14, A LT 27,  Alk phos 74,  T bili 0.5. CT scan with no acute findings. Right upper quadrant ultrasound also completed with concerns for some biliary sludge.  No CBD. Mild fatty infiltration of liver also noted. He was admitted for acute pancreatitis. Patient was placed on PPI. Diet was advanced and he was discahrged home on 9/13. He was seen by bariatric surgeon, Dr. Colin Magana on 9/14. He was seen by general surgeon, Dr. Liliya Shafer on 9/15 and underwent lap alice on 9/18. Pt went back to the ED on 9/23 with c/o epigastric pain. CT scan with no cystic free fluid seen inferior to the cecum in the pelvis. No evidence of focal fluid that would suggest abscess in this patient who has recently undergone cholecystectomy Moderate stool which may reflect constipation. He followed up with Dr. Magana on 9/26 and was presribed Carafate. He underwent EGD on 10/3. Records not available, but no acute findings per patient. He was seen in the ED again on 10/10 with persistent epigastric pain, nausea and abdominal bloating. LIpase 173, , . Tbili and Alk phos wnl. CT scan showed mild enteritis. He was discharged home on Augmentin. Since discharge, he had been doing ok until this past weekend when he had recurrent pain. He has been taking Levsin, Zofran, Carafate, Protonix, Tramadol and Miralax. States he has lost alomost 20 lbs. His last colonoscopy was at least 5 years ago at [facility] and was reportedly normal.  10/24/23: Pt RTC for urgent visit. He reports worsening abdominal pain/crmpaing this past Lionel in the early morning. He took Tramadol. The pain/cramping persisted on Sunday. He has been taking Miralax 1 1/2 caps twice daily. His stools are soft, but he continues to have to strain and does not feel like he completely evacuates. Last night, his pain recurred. He took a Dulcolax and Levsin. Has had 1 BM today. He took Levsin again today which provides some relief. He is not sure when his last colonoscopy was.  11/10/23: Pt RTC for f/u. Reports improvement in constipation with Linzess. Having a BM daily. He uses a squatty potty. Admits to straining. Stools are soft, but very long. He is still taking Metamucil. He is scheduled for cscope on 12/8/23. Reports mild epigatric burning after drinking crystal light water and a sip of Ana María. He takes Protonix in AM and Pepcid 20 mg at bedtime.  11/20/23: Pt RTC for urgent visit. He c/o recurrent intermittent postprandial abdominal pain this past weekend and the weekend prior. Pain ussually occurs about 15-30 minutes after eating. He has also noticed a correlation with hypoglycemia. He has a histoty of reactive hypoglycemia. He has been taking Tramdol and Levsin. Per Dr. Viadl's progress note on 10/17, EGD was negative for identifying an ulcer.  There was no erythema or erosion.  Biopsies came back negative for H. pylori or gastritis.  Of importance I noticed the stoma of his gastrojejunal anastomosis was slightly enlarged and he has a candycane shape stump of the Jayesh limb.  1/8/24: Pt RTC for f/u. Had cscope 12/14/23: - The entire examined colon is normal. - No specimens collected.  MRI/MRCP 12/16/23: Status post cholecystectomy. No evidence of choledocholithiasis. No intra or extra hepatic biliary ductal dilatation.  Pt reports improvement in symptoms. Remains on Linzess and takes Levsin prn. He started a new medication, acarbose, for reactive hypoglycemia.  2/9/24: Pt RTC for f/u. Doing well on Linzess with no new complaints. Takes Levsin prn. States his company is requesting that he come into the office twice a week, which involves anywhere from a 1-2 hour commute. He is a  for [employer].  3/7/24: Pt RTC for f/u. He was seen in ED on 2/29, then admitted from 3/1-3/3 for epigastric abdominal pain, acute pancreatitis and transaminitis. Lipase 48-->134. LFTs elevated -->1034-->934-->391, -->646-->747-->552. Tbili and Alk phos wnl. Acute hep panel neg. Secondary liver w/u neg. TG normal. He started acarbose 2 months ago. EtOH level normal. CT and MRCP unremarkable. Since discharge, he continues to have postprandial epigastric pain that wraps around to his back and abdominal bloating. He has lost 10 lbs. He remains on Linzess, Protonix and Pepcid.  9/23/24: Pt RTC for f/u. He continues to c/o intermittent abdominal pain. He was seen agian in the ED in June with c/o abdominal pain.  , , Tbili 0.5, Alk phos 105, lipase wnl. CT scan revealed normal pancreas. Moderate fecal load in the colon which could reflect constipation. No dilated bowel. No marked bowel wall thickening. Since April, he has been taking liver supplement powders (Spirulina, yellow dock root, burn dock root, ground cardamom, amla, tumeric, milk thistle, dandilion root). He remains on Creon, Linzessa nd Levsin. He has been taking cyclobenzaprine and oxycodone as needed.

## 2024-11-26 ENCOUNTER — TELEPHONE ENCOUNTER (OUTPATIENT)
Dept: URBAN - NONMETROPOLITAN AREA CLINIC 4 | Facility: CLINIC | Age: 49
End: 2024-11-26

## 2024-11-26 RX ORDER — LINACLOTIDE 290 UG/1
1 CAPSULE AT LEAST 30 MINUTES BEFORE THE FIRST MEAL OF THE DAY ON AN EMPTY STOMACH CAPSULE, GELATIN COATED ORAL ONCE A DAY
Qty: 90 | Refills: 3
Start: 2023-11-10 | End: 2025-11-21

## 2024-12-10 ENCOUNTER — TELEPHONE ENCOUNTER (OUTPATIENT)
Dept: URBAN - NONMETROPOLITAN AREA CLINIC 4 | Facility: CLINIC | Age: 49
End: 2024-12-10

## 2024-12-10 RX ORDER — FAMOTIDINE 20 MG/1
1 TABLET AT BEDTIME AS NEEDED TABLET, FILM COATED ORAL ONCE A DAY
Qty: 90 TABLET | Refills: 3
Start: 2023-11-10

## 2024-12-31 ENCOUNTER — TELEPHONE ENCOUNTER (OUTPATIENT)
Dept: URBAN - NONMETROPOLITAN AREA CLINIC 4 | Facility: CLINIC | Age: 49
End: 2024-12-31

## 2024-12-31 RX ORDER — HYOSCYAMINE SULFATE 0.12 MG/1
1 TABLET UNDER THE TONGUE AND ALLOW TO DISSOLVE AS NEEDED TABLET SUBLINGUAL THREE TIMES A DAY
Refills: 3
End: 2025-04-30

## 2025-01-08 ENCOUNTER — TELEPHONE ENCOUNTER (OUTPATIENT)
Dept: URBAN - NONMETROPOLITAN AREA CLINIC 4 | Facility: CLINIC | Age: 50
End: 2025-01-08

## 2025-01-08 RX ORDER — PANCRELIPASE 36000; 180000; 114000 [USP'U]/1; [USP'U]/1; [USP'U]/1
AS DIRECTED CAPSULE, DELAYED RELEASE PELLETS ORAL DAILY
Qty: 300 | Refills: 3
Start: 2024-03-18 | End: 2025-05-08

## 2025-07-25 ENCOUNTER — WEB ENCOUNTER (OUTPATIENT)
Dept: URBAN - NONMETROPOLITAN AREA CLINIC 4 | Facility: CLINIC | Age: 50
End: 2025-07-25

## 2025-08-04 ENCOUNTER — OFFICE VISIT (OUTPATIENT)
Dept: URBAN - NONMETROPOLITAN AREA CLINIC 4 | Facility: CLINIC | Age: 50
End: 2025-08-04
Payer: COMMERCIAL

## 2025-08-04 DIAGNOSIS — K58.1 IRRITABLE BOWEL SYNDROME WITH CONSTIPATION: ICD-10-CM

## 2025-08-04 DIAGNOSIS — K21.9 GASTROESOPHAGEAL REFLUX DISEASE WITHOUT ESOPHAGITIS: ICD-10-CM

## 2025-08-04 DIAGNOSIS — Z90.49 STATUS POST CHOLECYSTECTOMY: ICD-10-CM

## 2025-08-04 DIAGNOSIS — K76.0 FATTY LIVER: ICD-10-CM

## 2025-08-04 DIAGNOSIS — R74.01 TRANSAMINITIS: ICD-10-CM

## 2025-08-04 DIAGNOSIS — Z98.84 STATUS POST GASTRIC BYPASS FOR OBESITY: ICD-10-CM

## 2025-08-04 DIAGNOSIS — K85.90 RECURRENT ACUTE PANCREATITIS: ICD-10-CM

## 2025-08-04 PROCEDURE — 99213 OFFICE O/P EST LOW 20 MIN: CPT | Performed by: REGISTERED NURSE

## 2025-08-04 RX ORDER — ACARBOSE 25 MG/1
AS DIRECTED TABLET ORAL
Status: ACTIVE | COMMUNITY

## 2025-08-04 RX ORDER — DEXTROAMPHETAMINE SACCHARATE, AMPHETAMINE ASPARTATE, DEXTROAMPHETAMINE SULFATE, AND AMPHETAMINE SULFATE 2.5; 2.5; 2.5; 2.5 MG/1; MG/1; MG/1; MG/1
1 TABLET TABLET ORAL TWICE A DAY
Status: ACTIVE | COMMUNITY

## 2025-08-04 RX ORDER — FAMOTIDINE 20 MG/1
1 TABLET AT BEDTIME AS NEEDED TABLET, FILM COATED ORAL ONCE A DAY
Qty: 90 TABLET | Refills: 3 | Status: ACTIVE | COMMUNITY
Start: 2023-11-10

## 2025-08-04 RX ORDER — LINACLOTIDE 290 UG/1
1 CAPSULE AT LEAST 30 MINUTES BEFORE THE FIRST MEAL OF THE DAY ON AN EMPTY STOMACH CAPSULE, GELATIN COATED ORAL ONCE A DAY
Qty: 90 | Refills: 3 | Status: ACTIVE | COMMUNITY
Start: 2023-11-10 | End: 2025-11-21

## 2025-08-04 RX ORDER — PANTOPRAZOLE SODIUM 40 MG/1
1 TABLET TABLET, DELAYED RELEASE ORAL ONCE A DAY
Status: ACTIVE | COMMUNITY

## 2025-08-20 ENCOUNTER — WEB ENCOUNTER (OUTPATIENT)
Dept: URBAN - NONMETROPOLITAN AREA CLINIC 4 | Facility: CLINIC | Age: 50
End: 2025-08-20